# Patient Record
Sex: FEMALE | Race: ASIAN | NOT HISPANIC OR LATINO | Employment: FULL TIME | URBAN - METROPOLITAN AREA
[De-identification: names, ages, dates, MRNs, and addresses within clinical notes are randomized per-mention and may not be internally consistent; named-entity substitution may affect disease eponyms.]

---
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
## 2020-11-21 PROCEDURE — U0004 COV-19 TEST NON-CDC HGH THRU: HCPCS | Performed by: FAMILY MEDICINE

## 2020-11-23 ENCOUNTER — TELEPHONE (OUTPATIENT)
Dept: URGENT CARE | Facility: CLINIC | Age: 20
End: 2020-11-23

## 2021-09-20 ENCOUNTER — LAB (OUTPATIENT)
Dept: LAB | Facility: HOSPITAL | Age: 21
End: 2021-09-20

## 2021-09-20 ENCOUNTER — HOSPITAL ENCOUNTER (OUTPATIENT)
Dept: GENERAL RADIOLOGY | Facility: HOSPITAL | Age: 21
Discharge: HOME | End: 2021-09-20

## 2021-09-20 ENCOUNTER — OFFICE VISIT (OUTPATIENT)
Dept: INTERNAL MEDICINE | Facility: CLINIC | Age: 21
End: 2021-09-20

## 2021-09-20 VITALS
HEIGHT: 59 IN | OXYGEN SATURATION: 99 % | SYSTOLIC BLOOD PRESSURE: 98 MMHG | TEMPERATURE: 98.7 F | BODY MASS INDEX: 36.29 KG/M2 | DIASTOLIC BLOOD PRESSURE: 64 MMHG | RESPIRATION RATE: 16 BRPM | WEIGHT: 180 LBS | HEART RATE: 74 BPM

## 2021-09-20 DIAGNOSIS — Z23 FLU VACCINE NEED: ICD-10-CM

## 2021-09-20 DIAGNOSIS — G43.009 MIGRAINE WITHOUT AURA AND WITHOUT STATUS MIGRAINOSUS, NOT INTRACTABLE: Primary | ICD-10-CM

## 2021-09-20 DIAGNOSIS — M54.2 NECK PAIN: ICD-10-CM

## 2021-09-20 DIAGNOSIS — Z91.018 MULTIPLE FOOD ALLERGIES: ICD-10-CM

## 2021-09-20 DIAGNOSIS — L65.9 HAIR LOSS: ICD-10-CM

## 2021-09-20 DIAGNOSIS — R20.0 NUMBNESS AND TINGLING OF BOTH UPPER EXTREMITIES: ICD-10-CM

## 2021-09-20 DIAGNOSIS — R20.2 NUMBNESS AND TINGLING OF BOTH UPPER EXTREMITIES: ICD-10-CM

## 2021-09-20 PROCEDURE — 84443 ASSAY THYROID STIM HORMONE: CPT | Performed by: NURSE PRACTITIONER

## 2021-09-20 PROCEDURE — 82728 ASSAY OF FERRITIN: CPT | Performed by: NURSE PRACTITIONER

## 2021-09-20 PROCEDURE — 82607 VITAMIN B-12: CPT | Performed by: NURSE PRACTITIONER

## 2021-09-20 PROCEDURE — 90471 IMMUNIZATION ADMIN: CPT | Performed by: NURSE PRACTITIONER

## 2021-09-20 PROCEDURE — 85025 COMPLETE CBC W/AUTO DIFF WBC: CPT | Performed by: NURSE PRACTITIONER

## 2021-09-20 PROCEDURE — 80053 COMPREHEN METABOLIC PANEL: CPT | Performed by: NURSE PRACTITIONER

## 2021-09-20 PROCEDURE — 82306 VITAMIN D 25 HYDROXY: CPT | Performed by: NURSE PRACTITIONER

## 2021-09-20 PROCEDURE — 99214 OFFICE O/P EST MOD 30 MIN: CPT | Performed by: NURSE PRACTITIONER

## 2021-09-20 PROCEDURE — 90686 IIV4 VACC NO PRSV 0.5 ML IM: CPT | Performed by: NURSE PRACTITIONER

## 2021-09-20 PROCEDURE — 72040 X-RAY EXAM NECK SPINE 2-3 VW: CPT

## 2021-09-20 RX ORDER — NAPROXEN 500 MG/1
500 TABLET ORAL 2 TIMES DAILY WITH MEALS
Qty: 60 TABLET | Refills: 1 | Status: SHIPPED | OUTPATIENT
Start: 2021-09-20

## 2021-09-20 NOTE — PROGRESS NOTES
Subjective   Rebina Khatiwada is a 20 y.o. female here to establish care.  Chief Complaint   Patient presents with   • Establish Care   • Hand Problem     numbness and tingling in hands after working   • Allergy Testing     would like referral for allergy testing   • Migraine     hx of migranes, based on strong smells       Migraine   This is a chronic problem. The current episode started more than 1 month ago. Associated symptoms include neck pain, numbness and phonophobia. Pertinent negatives include no abdominal pain, abnormal behavior, anorexia, back pain, blurred vision, coughing, dizziness, drainage, ear pain, eye pain, eye redness, eye watering, fever, hearing loss, loss of balance, muscle aches, nausea, photophobia, rhinorrhea, scalp tenderness, sinus pressure, swollen glands, tinnitus, visual change, vomiting, weakness or weight loss. Exacerbated by: lack of food intake. Treatments tried:  tylenol. The treatment provided mild relief. Her past medical history is significant for migraine headaches, migraines in the family and obesity. There is no history of cancer, cluster headaches, hypertension, immunosuppression, pseudotumor cerebri, recent head traumas, sinus disease or TMJ.      Migraines-    one side of the head.    describes as pulsating.   Has no photosensitivity.   Has some noise sensitivity.   Tried: rest or tylenol.   This typically helps,   Headaches occur when she works a lot ( 50-60 hours)   Avoids caffeine as it makes her anxious.         Hand- numbness and tingling. Worse when she wakes up in the am.    she has tightness and tension in her neck.   Sometimes cannot sleep    she denies any neck injury.    Allergies-   She thinks she may have allergy to peanuts.   She gets itchy all over and feel like she is suffocating when she eats them. She avoids ingestion of them and does ok.   She has not had allergy testing in the past and would like to complete this.   Hair has been thinning.      Health  maintenance:   Due for flu vaccine- wants to complete today     The following portions of the patient's history were reviewed and updated as appropriate: allergies, current medications, past family history, past medical history, past social history, past surgical history and problem list.    Review of Systems   Constitutional: Negative for activity change, appetite change, chills, diaphoresis, fatigue, fever, unexpected weight gain and unexpected weight loss.   HENT: Negative for ear pain, hearing loss, rhinorrhea, sinus pressure, swollen glands, tinnitus and voice change.    Eyes: Negative for blurred vision, double vision, photophobia, pain, redness and visual disturbance.   Respiratory: Negative for cough, chest tightness, shortness of breath and wheezing.    Cardiovascular: Negative for chest pain, palpitations and leg swelling.   Gastrointestinal: Negative for abdominal distention, abdominal pain, anorexia, constipation, diarrhea, nausea and vomiting.   Endocrine: Negative for cold intolerance, heat intolerance, polydipsia, polyphagia and polyuria.   Genitourinary: Negative for difficulty urinating, frequency and urgency.   Musculoskeletal: Positive for neck pain. Negative for arthralgias, back pain, myalgias and neck stiffness.   Skin: Negative for color change, dry skin and rash.   Neurological: Positive for numbness and headache. Negative for dizziness, tremors, facial asymmetry, weakness, light-headedness, loss of balance and confusion.   Hematological: Negative for adenopathy. Does not bruise/bleed easily.   Psychiatric/Behavioral: Negative for decreased concentration and sleep disturbance. The patient is not nervous/anxious.            Allergies   Allergen Reactions   • Peanut Oil Itching and Other (See Comments)     Fatigue    • Sulfa Antibiotics Headache     Past Medical History:   Diagnosis Date   • Headache      Past Surgical History:   Procedure Laterality Date   • NO PAST SURGERIES       Family  History   Problem Relation Age of Onset   • Migraines Mother    • Thyroid disease Mother    • Hypertension Father    • Hyperlipidemia Father    • Kidney disease Paternal Grandfather      Social History     Socioeconomic History   • Marital status: Single     Spouse name: Not on file   • Number of children: Not on file   • Years of education: Not on file   • Highest education level: Not on file   Tobacco Use   • Smoking status: Never Smoker   • Smokeless tobacco: Never Used   Vaping Use   • Vaping Use: Never used   Substance and Sexual Activity   • Alcohol use: Never   • Drug use: Never   • Sexual activity: Not Currently     Partners: Male     Immunization History   Administered Date(s) Administered   • COVID-19 (MODERNA) 05/11/2021, 06/09/2021   • DTaP, Unspecified 03/29/2015, 08/06/2015, 09/14/2015   • Flu Vaccine Quad PF >36MO 10/01/2017, 11/14/2017, 10/04/2018   • FluLaval/Fluarix (VFC) >6 Months 10/21/2020, 09/20/2021   • Flublok 18+yrs 11/01/2019   • Hep A, 2 Dose 03/29/2016   • Hep A, Unspecified 03/29/2016   • Hep B, Adolescent or Pediatric 08/06/2015, 09/14/2015, 12/23/2015   • Hep B, Unspecified 08/06/2015, 09/14/2015, 12/23/2015   • Hepatitis A 05/10/2018   • HiB 04/02/2015   • Hpv9 03/29/2016, 05/10/2018   • IPV 08/06/2015, 09/14/2015, 03/29/2016   • MMR 08/06/2015, 09/14/2015   • Meningococcal MCV4P (Menactra) 08/06/2015   • Meningococcal, Unspecified 08/06/2015   • Pneumococcal, Unspecified 02/27/2013   • Polio, Unspecified 03/29/2015, 08/06/2015, 09/14/2015   • Rotavirus, Unspecified 04/15/2015   • Td 03/29/2016   • Td, Unspecified 09/14/2015, 03/29/2016   • Tdap 08/06/2015, 07/18/2020, 06/09/2021   • Varicella 08/06/2014, 08/06/2015, 09/14/2015       Current Outpatient Medications:   •  BIOTIN PO, Take  by mouth., Disp: , Rfl:   •  Cholecalciferol (Vitamin D3) 1.25 MG (72257 UT) capsule, Take 1 capsule by mouth Every 7 (Seven) Days for 8 doses. Once you complete all 8 doses, switch over to vitamin D3  "1000 units daily over-the-counter, Disp: 8 capsule, Rfl: 0  •  naproxen (NAPROSYN) 500 MG tablet, Take 1 tablet by mouth 2 (Two) Times a Day With Meals., Disp: 60 tablet, Rfl: 1    Objective   Blood pressure 98/64, pulse 74, temperature 98.7 °F (37.1 °C), temperature source Infrared, resp. rate 16, height 151 cm (59.45\"), weight 81.6 kg (180 lb), last menstrual period 08/31/2021, SpO2 99 %.  Physical Exam  Vitals and nursing note reviewed.   Constitutional:       General: She is not in acute distress.     Appearance: Normal appearance. She is well-developed. She is not diaphoretic.   HENT:      Head: Normocephalic and atraumatic.   Eyes:      Conjunctiva/sclera: Conjunctivae normal.      Pupils: Pupils are equal, round, and reactive to light.   Neck:      Vascular: No JVD.   Cardiovascular:      Rate and Rhythm: Normal rate and regular rhythm.      Heart sounds: Normal heart sounds. No murmur heard.     Pulmonary:      Effort: Pulmonary effort is normal. No respiratory distress.      Breath sounds: Normal breath sounds.   Chest:      Chest wall: No tenderness.   Abdominal:      General: Bowel sounds are normal. There is no distension.      Palpations: Abdomen is soft.      Tenderness: There is no abdominal tenderness.   Musculoskeletal:         General: Normal range of motion.      Right forearm: Normal.      Right wrist: Normal.      Left wrist: Normal.      Right hand: Normal.      Left hand: Normal.      Cervical back: Full passive range of motion without pain and normal range of motion. No edema, erythema, signs of trauma, rigidity, torticollis or crepitus. No pain with movement, spinous process tenderness or muscular tenderness. Normal range of motion.   Skin:     General: Skin is warm and dry.      Coloration: Skin is not pale.      Findings: No erythema.   Neurological:      Mental Status: She is alert and oriented to person, place, and time.   Psychiatric:         Behavior: Behavior normal.         Thought " Content: Thought content normal.         Judgment: Judgment normal.         Assessment/Plan   Diagnoses and all orders for this visit:    1. Migraine without aura and without status migrainosus, not intractable (Primary)  Encouraged her to avoid known triggers.  Can use over-the-counter analgesics such as Excedrin Migraine as needed for migraines.  If begin to occur frequently or unrelieved by over-the-counter medications, we could consider prescription abortive medication or daily preventative medicines.  Suspect that her neck pain could be contributing to her migraines so we will work-up that as well.  2. Neck pain  -     XR Spine Cervical 2 or 3 View; Future  -     CBC & Differential; Future  -     Comprehensive Metabolic Panel; Future  -     TSH Rfx On Abnormal To Free T4; Future  -     Vitamin B12; Future  -     naproxen (NAPROSYN) 500 MG tablet; Take 1 tablet by mouth 2 (Two) Times a Day With Meals.  Dispense: 60 tablet; Refill: 1  We will check an x-ray of the cervical spine and go ahead and get her started on some naproxen twice daily with food.  Stop for GI symptoms.  3. Multiple food allergies  -     Ambulatory Referral to Allergy  Refer to allergist for allergy testing at her request.  4. Numbness and tingling of both upper extremities  -     EMG & Nerve Conduction Test; Future  -     CBC & Differential; Future  -     Comprehensive Metabolic Panel; Future  -     TSH Rfx On Abnormal To Free T4; Future  -     Vitamin B12; Future  -     naproxen (NAPROSYN) 500 MG tablet; Take 1 tablet by mouth 2 (Two) Times a Day With Meals.  Dispense: 60 tablet; Refill: 1  We will go ahead and get a nerve conduction study of the bilateral upper extremities, check labs, and get her started on naproxen.  We will follow-up once the above has been completed.  5. Flu vaccine need  -     FluLaval >6 Months ([id number])  Discussed flu vaccine with her.  She would like to receive in office today.  6. Hair loss  -     CBC &  Differential; Future  -     Comprehensive Metabolic Panel; Future  -     TSH Rfx On Abnormal To Free T4; Future  -     Vitamin B12; Future  -     Ferritin; Future  -     Vitamin D 25 Hydroxy; Future  Check labs as above.             Return in about 6 weeks (around 11/1/2021) for Recheck.  Plan of care discussed with pt. They verbalized understanding and agreement.       * Please note that portions of this note were completed with a voice recognition program. Efforts were made to edit the dictation but occasionally words are erroneously transcribed.

## 2021-09-21 LAB
25(OH)D3 SERPL-MCNC: 10.9 NG/ML (ref 30–100)
ALBUMIN SERPL-MCNC: 3.9 G/DL (ref 3.5–5.2)
ALBUMIN/GLOB SERPL: 1.2 G/DL
ALP SERPL-CCNC: 66 U/L (ref 39–117)
ALT SERPL W P-5'-P-CCNC: 28 U/L (ref 1–33)
ANION GAP SERPL CALCULATED.3IONS-SCNC: 11.5 MMOL/L (ref 5–15)
AST SERPL-CCNC: 32 U/L (ref 1–32)
BASOPHILS # BLD AUTO: 0.04 10*3/MM3 (ref 0–0.2)
BASOPHILS NFR BLD AUTO: 0.5 % (ref 0–1.5)
BILIRUB SERPL-MCNC: 0.5 MG/DL (ref 0–1.2)
BUN SERPL-MCNC: 10 MG/DL (ref 6–20)
BUN/CREAT SERPL: 20 (ref 7–25)
CALCIUM SPEC-SCNC: 9.4 MG/DL (ref 8.6–10.5)
CHLORIDE SERPL-SCNC: 102 MMOL/L (ref 98–107)
CO2 SERPL-SCNC: 23.5 MMOL/L (ref 22–29)
CREAT SERPL-MCNC: 0.5 MG/DL (ref 0.57–1)
DEPRECATED RDW RBC AUTO: 41.7 FL (ref 37–54)
EOSINOPHIL # BLD AUTO: 0.07 10*3/MM3 (ref 0–0.4)
EOSINOPHIL NFR BLD AUTO: 0.9 % (ref 0.3–6.2)
ERYTHROCYTE [DISTWIDTH] IN BLOOD BY AUTOMATED COUNT: 12.7 % (ref 12.3–15.4)
FERRITIN SERPL-MCNC: 52.6 NG/ML (ref 13–150)
GFR SERPL CREATININE-BSD FRML MDRD: >150 ML/MIN/1.73
GFR SERPL CREATININE-BSD FRML MDRD: >150 ML/MIN/1.73
GLOBULIN UR ELPH-MCNC: 3.2 GM/DL
GLUCOSE SERPL-MCNC: 72 MG/DL (ref 65–99)
HCT VFR BLD AUTO: 41.2 % (ref 34–46.6)
HGB BLD-MCNC: 12.8 G/DL (ref 12–15.9)
IMM GRANULOCYTES # BLD AUTO: 0.03 10*3/MM3 (ref 0–0.05)
IMM GRANULOCYTES NFR BLD AUTO: 0.4 % (ref 0–0.5)
LYMPHOCYTES # BLD AUTO: 2.17 10*3/MM3 (ref 0.7–3.1)
LYMPHOCYTES NFR BLD AUTO: 26.4 % (ref 19.6–45.3)
MCH RBC QN AUTO: 28.1 PG (ref 26.6–33)
MCHC RBC AUTO-ENTMCNC: 31.1 G/DL (ref 31.5–35.7)
MCV RBC AUTO: 90.5 FL (ref 79–97)
MONOCYTES # BLD AUTO: 0.38 10*3/MM3 (ref 0.1–0.9)
MONOCYTES NFR BLD AUTO: 4.6 % (ref 5–12)
NEUTROPHILS NFR BLD AUTO: 5.52 10*3/MM3 (ref 1.7–7)
NEUTROPHILS NFR BLD AUTO: 67.2 % (ref 42.7–76)
NRBC BLD AUTO-RTO: 0 /100 WBC (ref 0–0.2)
PLATELET # BLD AUTO: 263 10*3/MM3 (ref 140–450)
PMV BLD AUTO: 12.2 FL (ref 6–12)
POTASSIUM SERPL-SCNC: 4 MMOL/L (ref 3.5–5.2)
PROT SERPL-MCNC: 7.1 G/DL (ref 6–8.5)
RBC # BLD AUTO: 4.55 10*6/MM3 (ref 3.77–5.28)
SODIUM SERPL-SCNC: 137 MMOL/L (ref 136–145)
TSH SERPL DL<=0.05 MIU/L-ACNC: 0.97 UIU/ML (ref 0.27–4.2)
VIT B12 BLD-MCNC: 404 PG/ML (ref 211–946)
WBC # BLD AUTO: 8.21 10*3/MM3 (ref 3.4–10.8)

## 2021-09-22 DIAGNOSIS — E55.9 VITAMIN D DEFICIENCY: Primary | ICD-10-CM

## 2021-09-22 RX ORDER — CHOLECALCIFEROL (VITAMIN D3) 1250 MCG
50000 CAPSULE ORAL
Qty: 8 CAPSULE | Refills: 0 | Status: SHIPPED | OUTPATIENT
Start: 2021-09-22 | End: 2021-11-11

## 2021-09-30 ENCOUNTER — TELEPHONE (OUTPATIENT)
Dept: INTERNAL MEDICINE | Facility: CLINIC | Age: 21
End: 2021-09-30

## 2021-09-30 NOTE — TELEPHONE ENCOUNTER
PT TRIED TO SCHEDULE HER ALLERGY TESTING AND WAS TOLD IT WAS CLOSED. SHE NEEDS A NEW REFERRAL FOR ALLERGY TESTING.

## 2021-09-30 NOTE — TELEPHONE ENCOUNTER
Called and spoke with patient, informed her that referral was closed because it was scheduled already. I did give her the information to contact Dinora Swain and get an appointment booked. She verbalized understanding and had no further questions.

## 2021-11-01 ENCOUNTER — OFFICE VISIT (OUTPATIENT)
Dept: INTERNAL MEDICINE | Facility: CLINIC | Age: 21
End: 2021-11-01

## 2021-11-01 VITALS
WEIGHT: 185 LBS | HEIGHT: 60 IN | TEMPERATURE: 97.7 F | OXYGEN SATURATION: 98 % | SYSTOLIC BLOOD PRESSURE: 122 MMHG | BODY MASS INDEX: 36.32 KG/M2 | HEART RATE: 61 BPM | DIASTOLIC BLOOD PRESSURE: 70 MMHG

## 2021-11-01 DIAGNOSIS — E55.9 VITAMIN D DEFICIENCY: ICD-10-CM

## 2021-11-01 DIAGNOSIS — M79.10 MUSCLE PAIN: ICD-10-CM

## 2021-11-01 DIAGNOSIS — R20.0 NUMBNESS AND TINGLING OF BOTH UPPER EXTREMITIES: Chronic | ICD-10-CM

## 2021-11-01 DIAGNOSIS — R20.2 NUMBNESS AND TINGLING OF BOTH UPPER EXTREMITIES: Chronic | ICD-10-CM

## 2021-11-01 DIAGNOSIS — G43.009 MIGRAINE WITHOUT AURA AND WITHOUT STATUS MIGRAINOSUS, NOT INTRACTABLE: Primary | Chronic | ICD-10-CM

## 2021-11-01 PROCEDURE — 99214 OFFICE O/P EST MOD 30 MIN: CPT | Performed by: NURSE PRACTITIONER

## 2021-11-01 RX ORDER — CYCLOBENZAPRINE HCL 5 MG
5 TABLET ORAL 3 TIMES DAILY PRN
Qty: 30 TABLET | Refills: 0 | Status: SHIPPED | OUTPATIENT
Start: 2021-11-01

## 2021-11-01 NOTE — PROGRESS NOTES
Rebina Khatiwada presents to [facility] for     Chief Complaint  Chief Complaint   Patient presents with   • Headache   • Hand Problem     numbeness and tingling          Subjective        History of Present Illness    Rebina Khatiwada is a 20 y.o. female who is following up today on migraines and numbness and tingling in her hands.  She tells me that the headaches are better.   Non headaches since last visit.  She is occasionally having some pain in her neck.  We did complete a cervical x-ray which showed straightening of the normal lordosis of the C-spine but no evidence of fracture or dislocation.  She also had labs including CBC, CMP, TSH, B12, ferritin, and vitamin D.  All of which were normal except for low vitamin D.  She is taking vitamin D supplementation prescription.  We ordered a nerve conduction study at her last visit.  This is scheduled for 11/9/2021.  She has been using naproxen as needed.  She thinks it does help but does not have a lot of pain now.  She tells me she woke up earlier today with some pain in her back and neck.  She feels as though she has muscle tightness.  She denies any injuries or traumas.        Review of Systems   Constitutional: Negative for activity change, appetite change, chills, diaphoresis, fatigue, fever, unexpected weight gain and unexpected weight loss.   HENT: Negative for ear pain, hearing loss, rhinorrhea, sinus pressure, swollen glands, tinnitus and voice change.    Eyes: Negative for blurred vision, double vision, photophobia, pain, redness and visual disturbance.   Respiratory: Negative for cough, chest tightness, shortness of breath and wheezing.    Cardiovascular: Negative for chest pain, palpitations and leg swelling.   Gastrointestinal: Negative for abdominal distention, abdominal pain, constipation, diarrhea, nausea and vomiting.   Endocrine: Negative for cold intolerance, heat intolerance, polydipsia, polyphagia and polyuria.    Genitourinary: Negative for difficulty urinating, frequency and urgency.   Musculoskeletal: Positive for arthralgias, myalgias and neck pain. Negative for back pain and neck stiffness.   Skin: Negative for color change, dry skin and rash.   Neurological: Positive for numbness and headache ( Improved). Negative for dizziness, tremors, facial asymmetry, weakness, light-headedness and confusion.   Hematological: Negative for adenopathy. Does not bruise/bleed easily.   Psychiatric/Behavioral: Negative for decreased concentration and sleep disturbance. The patient is not nervous/anxious.          Allergies   Allergen Reactions   • Peanut Oil Itching and Other (See Comments)     Fatigue    • Sulfa Antibiotics Headache     Current Outpatient Medications on File Prior to Visit   Medication Sig Dispense Refill   • BIOTIN PO Take  by mouth.     • Cholecalciferol (Vitamin D3) 1.25 MG (78382 UT) capsule Take 1 capsule by mouth Every 7 (Seven) Days for 8 doses. Once you complete all 8 doses, switch over to vitamin D3 1000 units daily over-the-counter 8 capsule 0   • naproxen (NAPROSYN) 500 MG tablet Take 1 tablet by mouth 2 (Two) Times a Day With Meals. 60 tablet 1     No current facility-administered medications on file prior to visit.         The following portions of the patient's history were reviewed and updated as appropriate: allergies, current medications, past family history, past medical history, past social history, past surgical history and problem list and are available for review within electronic record    Objective     Result Review :     The following data was reviewed by: ROB Alvarenga on 11/01/2021:    Recent Results (from the past 1008 hour(s))   Comprehensive Metabolic Panel    Collection Time: 09/20/21  2:14 PM    Specimen: Blood   Result Value Ref Range    Glucose 72 65 - 99 mg/dL    BUN 10 6 - 20 mg/dL    Creatinine 0.50 (L) 0.57 - 1.00 mg/dL    Sodium 137 136 - 145 mmol/L    Potassium 4.0 3.5  - 5.2 mmol/L    Chloride 102 98 - 107 mmol/L    CO2 23.5 22.0 - 29.0 mmol/L    Calcium 9.4 8.6 - 10.5 mg/dL    Total Protein 7.1 6.0 - 8.5 g/dL    Albumin 3.90 3.50 - 5.20 g/dL    ALT (SGPT) 28 1 - 33 U/L    AST (SGOT) 32 1 - 32 U/L    Alkaline Phosphatase 66 39 - 117 U/L    Total Bilirubin 0.5 0.0 - 1.2 mg/dL    eGFR Non African Amer >150 >60 mL/min/1.73    eGFR  African Amer >150 >60 mL/min/1.73    Globulin 3.2 gm/dL    A/G Ratio 1.2 g/dL    BUN/Creatinine Ratio 20.0 7.0 - 25.0    Anion Gap 11.5 5.0 - 15.0 mmol/L   TSH Rfx On Abnormal To Free T4    Collection Time: 09/20/21  2:14 PM    Specimen: Blood   Result Value Ref Range    TSH 0.967 0.270 - 4.200 uIU/mL   Vitamin B12    Collection Time: 09/20/21  2:14 PM    Specimen: Blood   Result Value Ref Range    Vitamin B-12 404 211 - 946 pg/mL   Ferritin    Collection Time: 09/20/21  2:14 PM    Specimen: Blood   Result Value Ref Range    Ferritin 52.60 13.00 - 150.00 ng/mL   Vitamin D 25 Hydroxy    Collection Time: 09/20/21  2:14 PM    Specimen: Blood   Result Value Ref Range    25 Hydroxy, Vitamin D 10.9 (L) 30.0 - 100.0 ng/ml   CBC Auto Differential    Collection Time: 09/20/21  2:14 PM    Specimen: Blood   Result Value Ref Range    WBC 8.21 3.40 - 10.80 10*3/mm3    RBC 4.55 3.77 - 5.28 10*6/mm3    Hemoglobin 12.8 12.0 - 15.9 g/dL    Hematocrit 41.2 34.0 - 46.6 %    MCV 90.5 79.0 - 97.0 fL    MCH 28.1 26.6 - 33.0 pg    MCHC 31.1 (L) 31.5 - 35.7 g/dL    RDW 12.7 12.3 - 15.4 %    RDW-SD 41.7 37.0 - 54.0 fl    MPV 12.2 (H) 6.0 - 12.0 fL    Platelets 263 140 - 450 10*3/mm3    Neutrophil % 67.2 42.7 - 76.0 %    Lymphocyte % 26.4 19.6 - 45.3 %    Monocyte % 4.6 (L) 5.0 - 12.0 %    Eosinophil % 0.9 0.3 - 6.2 %    Basophil % 0.5 0.0 - 1.5 %    Immature Grans % 0.4 0.0 - 0.5 %    Neutrophils, Absolute 5.52 1.70 - 7.00 10*3/mm3    Lymphocytes, Absolute 2.17 0.70 - 3.10 10*3/mm3    Monocytes, Absolute 0.38 0.10 - 0.90 10*3/mm3    Eosinophils, Absolute 0.07 0.00 - 0.40 10*3/mm3  "   Basophils, Absolute 0.04 0.00 - 0.20 10*3/mm3    Immature Grans, Absolute 0.03 0.00 - 0.05 10*3/mm3    nRBC 0.0 0.0 - 0.2 /100 WBC                Vital Signs:   /70   Pulse 61   Temp 97.7 °F (36.5 °C)   Ht 152.4 cm (60\")   Wt 83.9 kg (185 lb)   SpO2 98%   BMI 36.13 kg/m²         Physical Exam  Vitals and nursing note reviewed.   Constitutional:       General: She is not in acute distress.     Appearance: Normal appearance. She is well-developed. She is not diaphoretic.   HENT:      Head: Normocephalic and atraumatic.   Eyes:      Conjunctiva/sclera: Conjunctivae normal.      Pupils: Pupils are equal, round, and reactive to light.   Neck:      Vascular: No JVD.   Cardiovascular:      Rate and Rhythm: Normal rate and regular rhythm.      Heart sounds: Normal heart sounds. No murmur heard.      Pulmonary:      Effort: Pulmonary effort is normal. No respiratory distress.      Breath sounds: Normal breath sounds.   Chest:      Chest wall: No tenderness.   Abdominal:      General: Bowel sounds are normal. There is no distension.      Palpations: Abdomen is soft.      Tenderness: There is no abdominal tenderness.   Musculoskeletal:         General: Normal range of motion.      Right forearm: Normal.      Right wrist: Normal.      Left wrist: Normal.      Right hand: Normal.      Left hand: Normal.      Cervical back: Full passive range of motion without pain and normal range of motion. Tenderness present. No edema, erythema, signs of trauma, rigidity, torticollis or crepitus. Muscular tenderness present. No pain with movement or spinous process tenderness. Normal range of motion.      Thoracic back: Spasms present.   Skin:     General: Skin is warm and dry.      Coloration: Skin is not pale.      Findings: No erythema.   Neurological:      Mental Status: She is alert and oriented to person, place, and time.   Psychiatric:         Behavior: Behavior normal.         Thought Content: Thought content normal.    "      Judgment: Judgment normal.                 Assessment and Plan      Diagnoses and all orders for this visit:    1. Migraine without aura and without status migrainosus, not intractable (Primary)  No migraine since last visit.  If does have migraine recommend she take Excedrin Migraine over-the-counter.  Should use sparingly-no more than 10 doses a month to avoid rebound headaches.  Follow-up if migraines increase in frequency.  2. Numbness and tingling of both upper extremities  Complete nerve conduction study as planned.  3. Vitamin D deficiency  Continue vitamin D supplementation.  After she has finished the prescription dose she should switch over to vitamin D 3000 units over-the-counter daily.  We will plan on rechecking her vitamin D with her annual exam early next year.  4. Muscle pain  -     cyclobenzaprine (FLEXERIL) 5 MG tablet; Take 1 tablet by mouth 3 (Three) Times a Day As Needed (muscle spasms/pain).  Dispense: 30 tablet; Refill: 0  Add Flexeril as needed.  Adverse effects discussed.    Follow Up     Patient was given instructions and counseling regarding her condition or for health maintenance advice. Please see specific information pulled into the AVS if appropriate.   Any new medication's adverse effects have been discussed in detail with patient  Patient was encouraged to keep me informed of any acute changes, lack of improvement, or any new concerning symptoms.    Return in about 3 months (around 2/1/2022) for Annual.          Dictated Utilizing Dragon Dictation   Please note that portions of this note were completed with a voice recognition program.   Part of this note may be an electronic transcription/translation of spoken language to printed text using the Dragon Dictation System.

## 2021-11-09 ENCOUNTER — HOSPITAL ENCOUNTER (OUTPATIENT)
Dept: NEUROLOGY | Facility: HOSPITAL | Age: 21
Discharge: HOME | End: 2021-11-09
Admitting: NURSE PRACTITIONER

## 2021-11-09 DIAGNOSIS — R20.0 NUMBNESS AND TINGLING OF BOTH UPPER EXTREMITIES: ICD-10-CM

## 2021-11-09 DIAGNOSIS — R20.2 NUMBNESS AND TINGLING OF BOTH UPPER EXTREMITIES: ICD-10-CM

## 2021-11-09 PROCEDURE — 95886 MUSC TEST DONE W/N TEST COMP: CPT

## 2021-11-09 PROCEDURE — 95910 NRV CNDJ TEST 7-8 STUDIES: CPT

## 2021-11-16 ENCOUNTER — TELEPHONE (OUTPATIENT)
Dept: INTERNAL MEDICINE | Facility: CLINIC | Age: 21
End: 2021-11-16

## 2021-11-16 DIAGNOSIS — G56.03 BILATERAL CARPAL TUNNEL SYNDROME: Primary | ICD-10-CM

## 2021-11-16 NOTE — TELEPHONE ENCOUNTER
[facility] for the patient to return our call, office number was provided      HUB TO READ: Please provide the patient with the below provider comments. Document her answers to the questions and route back to the clinical pool. Thank you!     ----- Message from ROB Rossi sent at 11/15/2021  6:50 AM EST -----  Please let her know that her EMG/nerve conduction study showed carpal tunnel.  Treatment involves some splinting at night as well as anti-inflammatory medications such as ibuprofen.  Does she have splints that she can wear at night or does she need me to place an order and us fax it to a DME of her choice?

## 2021-11-16 NOTE — TELEPHONE ENCOUNTER
Caller: Khatiwada, Rebina    Relationship: Self    What was the call regarding: PATIENT WAS GIVEN MESSAGE BY HUB.  PATIENT NEEDS DOES NEED A SPLINT.  PATIENT STATED THAT SHE USE [facility] ON [facility].    Do you require a callback: YES

## 2021-11-16 NOTE — TELEPHONE ENCOUNTER
I have ordered the splints but she has to get from a DME,  not a pharmacy. Please have her either  the order or we can fax to a DME of her choice.

## 2021-11-17 NOTE — TELEPHONE ENCOUNTER
[facility] for the patient to return our call, office number was provided      HUB TO READ: Please ask the patient which [facility] company that she would like us to fax her order for wrist splints to. We are unable to send to [facility]. Thank you!

## 2021-11-17 NOTE — TELEPHONE ENCOUNTER
Patient called back and requested that we send it to a place close to her. She asked for [facility] on [facility]. Order and demographics faxed to them.

## 2021-11-17 NOTE — TELEPHONE ENCOUNTER
THE PATIENT STATES THAT SHE CONTACTED [facility] AND THEY TOLD HER THAT THEY ONLY COVER OXYGEN SUPPLIES. THE PATIENT STATES THAT SHE NEEDS TO GET A SPLINT FOR HER WRIST BUT SHE IS NOT SURE WERE TO GO     CALL [phone]

## 2021-11-18 ENCOUNTER — TELEPHONE (OUTPATIENT)
Dept: INTERNAL MEDICINE | Facility: CLINIC | Age: 21
End: 2021-11-18

## 2021-11-18 NOTE — TELEPHONE ENCOUNTER
Spoke with the patient, her DME order was faxed to [facility] at [address] (phone: [phone])

## 2021-11-18 NOTE — TELEPHONE ENCOUNTER
Contacted patient to let her know her order for Orthosis wrist extension control cock up order has been placed with [facility] as [facility] is only respiratory care. Order was faxed and received by [facility] [phone] patient was given address of where to go [address].

## 2022-01-01 ENCOUNTER — PATIENT MESSAGE (OUTPATIENT)
Dept: INTERNAL MEDICINE | Facility: CLINIC | Age: 22
End: 2022-01-01

## 2022-01-04 NOTE — TELEPHONE ENCOUNTER
Tasha Bae MA 1/3/2022 9:29 AM EST      ----- Message -----  From: Rebina Khatiwada  Sent: 1/1/2022 7:19 AM EST  To: [facility] Clinical Pool  Subject: Headache     My employer isn't considerate enough*   Sorry I wrote it wrong